# Patient Record
Sex: MALE | Race: WHITE | Employment: FULL TIME | URBAN - METROPOLITAN AREA
[De-identification: names, ages, dates, MRNs, and addresses within clinical notes are randomized per-mention and may not be internally consistent; named-entity substitution may affect disease eponyms.]

---

## 2017-02-09 PROCEDURE — 36415 COLL VENOUS BLD VENIPUNCTURE: CPT | Performed by: INTERNAL MEDICINE

## 2017-02-09 PROCEDURE — 84450 TRANSFERASE (AST) (SGOT): CPT | Performed by: INTERNAL MEDICINE

## 2017-02-09 PROCEDURE — 84460 ALANINE AMINO (ALT) (SGPT): CPT | Performed by: INTERNAL MEDICINE

## 2017-07-13 PROBLEM — R73.09 ELEVATED GLUCOSE: Status: ACTIVE | Noted: 2017-07-13

## 2018-07-17 PROBLEM — G47.33 OSA ON CPAP: Status: ACTIVE | Noted: 2018-07-17

## 2018-07-17 PROBLEM — Z99.89 OSA ON CPAP: Status: ACTIVE | Noted: 2018-07-17

## 2019-03-26 PROBLEM — E66.9 OBESITY (BMI 35.0-39.9 WITHOUT COMORBIDITY): Status: ACTIVE | Noted: 2019-03-26

## 2020-02-23 ENCOUNTER — HOSPITAL ENCOUNTER (EMERGENCY)
Age: 60
Discharge: HOME | End: 2020-02-23
Attending: EMERGENCY MEDICINE
Payer: COMMERCIAL

## 2020-02-23 VITALS
DIASTOLIC BLOOD PRESSURE: 75 MMHG | RESPIRATION RATE: 20 BRPM | HEIGHT: 76 IN | SYSTOLIC BLOOD PRESSURE: 132 MMHG | OXYGEN SATURATION: 92 % | WEIGHT: 270 LBS | TEMPERATURE: 99 F | BODY MASS INDEX: 32.88 KG/M2 | HEART RATE: 87 BPM

## 2020-02-23 DIAGNOSIS — J11.1 INFLUENZA: Primary | ICD-10-CM

## 2020-02-23 LAB
POCT INFLUENZA A: POSITIVE
POCT INFLUENZA B: NEGATIVE

## 2020-02-23 PROCEDURE — 99282 EMERGENCY DEPT VISIT SF MDM: CPT

## 2020-02-23 PROCEDURE — 87502 INFLUENZA DNA AMP PROBE: CPT | Performed by: EMERGENCY MEDICINE

## 2020-02-23 RX ORDER — ACETAMINOPHEN 500 MG
1000 TABLET ORAL ONCE
Status: COMPLETED | OUTPATIENT
Start: 2020-02-23 | End: 2020-02-23

## 2020-02-24 NOTE — ED PROVIDER NOTES
Patient Seen in: [facility] In [place]      History   Patient presents with:  Cough/URI    Stated Complaint: fever, cough, chills, body aches x 2 days     HPI    64-year-old male comes to the hospital chief complaint having difficulty kg   SpO2 92%   BMI 32.87 kg/m²         Physical Exam    HEENT : NCAT, EOMI, PEERL, throat clear, neck supple, no JVD, trachea midline, No LAD  Heart: S1S2 normal. No murmurs, regular rate and rhythm  Lungs: Clear to auscultation bilaterally  Abdomen: Soft

## 2020-07-17 ENCOUNTER — HOSPITAL ENCOUNTER (OUTPATIENT)
Dept: CT IMAGING | Age: 60
Discharge: HOME | End: 2020-07-17
Attending: INTERNAL MEDICINE

## 2020-07-17 DIAGNOSIS — I10 ESSENTIAL HYPERTENSION: ICD-10-CM

## 2020-07-21 ENCOUNTER — ORDER TRANSCRIPTION (OUTPATIENT)
Dept: ADMINISTRATIVE | Facility: HOSPITAL | Age: 60
End: 2020-07-21

## 2020-07-21 DIAGNOSIS — Z13.9 ENCOUNTER FOR SCREENING: Primary | ICD-10-CM

## 2020-08-20 ENCOUNTER — HOSPITAL ENCOUNTER (OUTPATIENT)
Dept: CARDIOLOGY CLINIC | Facility: HOSPITAL | Age: 60
Discharge: HOME | End: 2020-08-20
Attending: INTERNAL MEDICINE

## 2020-08-20 DIAGNOSIS — Z13.9 ENCOUNTER FOR SCREENING: ICD-10-CM

## 2020-09-04 ENCOUNTER — ORDER TRANSCRIPTION (OUTPATIENT)
Dept: ADMINISTRATIVE | Facility: HOSPITAL | Age: 60
End: 2020-09-04

## 2020-09-04 DIAGNOSIS — Z01.818 OTHER SPECIFIED PRE-OPERATIVE EXAMINATION: Primary | ICD-10-CM

## 2020-09-04 DIAGNOSIS — Z11.59 ENCOUNTER FOR SCREENING FOR OTHER VIRAL DISEASES: ICD-10-CM

## 2020-09-04 PROBLEM — E04.1 THYROID NODULE: Status: ACTIVE | Noted: 2020-09-04

## 2020-09-18 RX ORDER — SODIUM CHLORIDE, SODIUM LACTATE, POTASSIUM CHLORIDE, CALCIUM CHLORIDE 600; 310; 30; 20 MG/100ML; MG/100ML; MG/100ML; MG/100ML
INJECTION, SOLUTION INTRAVENOUS CONTINUOUS
Status: CANCELLED | OUTPATIENT
Start: 2020-09-18

## 2020-09-23 ENCOUNTER — LAB ENCOUNTER (OUTPATIENT)
Dept: LAB | Facility: HOSPITAL | Age: 60
End: 2020-09-23
Attending: OTOLARYNGOLOGY
Payer: COMMERCIAL

## 2020-09-23 DIAGNOSIS — E04.1 THYROID NODULE: ICD-10-CM

## 2020-09-23 DIAGNOSIS — Z11.59 ENCOUNTER FOR SCREENING FOR OTHER VIRAL DISEASES: ICD-10-CM

## 2020-09-23 DIAGNOSIS — Z01.818 OTHER SPECIFIED PRE-OPERATIVE EXAMINATION: ICD-10-CM

## 2020-09-23 LAB
ANION GAP SERPL CALC-SCNC: 2 MMOL/L (ref 0–18)
BUN BLD-MCNC: 27 MG/DL (ref 7–18)
BUN/CREAT SERPL: 21.6 (ref 10–20)
CALCIUM BLD-MCNC: 9.5 MG/DL (ref 8.5–10.1)
CHLORIDE SERPL-SCNC: 108 MMOL/L (ref 98–112)
CO2 SERPL-SCNC: 31 MMOL/L (ref 21–32)
CREAT BLD-MCNC: 1.25 MG/DL
GLUCOSE BLD-MCNC: 95 MG/DL (ref 70–99)
OSMOLALITY SERPL CALC.SUM OF ELEC: 297 MOSM/KG (ref 275–295)
PATIENT FASTING Y/N/NP: NO
POTASSIUM SERPL-SCNC: 4 MMOL/L (ref 3.5–5.1)
SODIUM SERPL-SCNC: 141 MMOL/L (ref 136–145)

## 2020-09-23 PROCEDURE — 93005 ELECTROCARDIOGRAM TRACING: CPT

## 2020-09-23 PROCEDURE — 36415 COLL VENOUS BLD VENIPUNCTURE: CPT

## 2020-09-23 PROCEDURE — 80048 BASIC METABOLIC PNL TOTAL CA: CPT

## 2020-09-23 PROCEDURE — 93010 ELECTROCARDIOGRAM REPORT: CPT | Performed by: INTERNAL MEDICINE

## 2020-09-24 LAB
ATRIAL RATE: 82 BPM
P AXIS: 40 DEGREES
P-R INTERVAL: 174 MS
Q-T INTERVAL: 386 MS
QRS DURATION: 116 MS
QTC CALCULATION (BEZET): 450 MS
R AXIS: -23 DEGREES
T AXIS: 15 DEGREES
VENTRICULAR RATE: 82 BPM

## 2020-09-25 ENCOUNTER — ANESTHESIA EVENT (OUTPATIENT)
Dept: ULTRASOUND IMAGING | Facility: HOSPITAL | Age: 60
End: 2020-09-25
Payer: COMMERCIAL

## 2020-09-25 ENCOUNTER — ANESTHESIA (OUTPATIENT)
Dept: ULTRASOUND IMAGING | Facility: HOSPITAL | Age: 60
End: 2020-09-25
Payer: COMMERCIAL

## 2020-09-25 ENCOUNTER — HOSPITAL ENCOUNTER (OUTPATIENT)
Dept: ULTRASOUND IMAGING | Facility: HOSPITAL | Age: 60
Discharge: HOME | End: 2020-09-25
Attending: OTOLARYNGOLOGY
Payer: COMMERCIAL

## 2020-09-25 VITALS
HEART RATE: 71 BPM | HEIGHT: 75.5 IN | BODY MASS INDEX: 33.22 KG/M2 | SYSTOLIC BLOOD PRESSURE: 122 MMHG | WEIGHT: 270 LBS | DIASTOLIC BLOOD PRESSURE: 84 MMHG | RESPIRATION RATE: 18 BRPM | TEMPERATURE: 98 F | OXYGEN SATURATION: 97 %

## 2020-09-25 DIAGNOSIS — E04.1 THYROID NODULE: Primary | ICD-10-CM

## 2020-09-25 LAB — SARS-COV-2 RNA RESP QL NAA+PROBE: NOT DETECTED

## 2020-09-25 PROCEDURE — 10005 FNA BX W/US GDN 1ST LES: CPT | Performed by: OTOLARYNGOLOGY

## 2020-09-25 PROCEDURE — 88173 CYTOPATH EVAL FNA REPORT: CPT | Performed by: OTOLARYNGOLOGY

## 2020-09-25 RX ORDER — MIDAZOLAM HYDROCHLORIDE 1 MG/ML
INJECTION INTRAMUSCULAR; INTRAVENOUS AS NEEDED
Status: DISCONTINUED | OUTPATIENT
Start: 2020-09-25 | End: 2020-09-25 | Stop reason: SURG

## 2020-09-25 RX ORDER — SODIUM CHLORIDE, SODIUM LACTATE, POTASSIUM CHLORIDE, CALCIUM CHLORIDE 600; 310; 30; 20 MG/100ML; MG/100ML; MG/100ML; MG/100ML
INJECTION, SOLUTION INTRAVENOUS CONTINUOUS
Status: DISCONTINUED | OUTPATIENT
Start: 2020-09-25 | End: 2020-09-27

## 2020-09-25 RX ORDER — NALOXONE HYDROCHLORIDE 0.4 MG/ML
80 INJECTION, SOLUTION INTRAMUSCULAR; INTRAVENOUS; SUBCUTANEOUS AS NEEDED
Status: ACTIVE | OUTPATIENT
Start: 2020-09-25 | End: 2020-09-25

## 2020-09-25 RX ORDER — HYDROMORPHONE HYDROCHLORIDE 1 MG/ML
0.4 INJECTION, SOLUTION INTRAMUSCULAR; INTRAVENOUS; SUBCUTANEOUS EVERY 5 MIN PRN
Status: ACTIVE | OUTPATIENT
Start: 2020-09-25 | End: 2020-09-25

## 2020-09-25 RX ORDER — LIDOCAINE HYDROCHLORIDE 10 MG/ML
INJECTION, SOLUTION EPIDURAL; INFILTRATION; INTRACAUDAL; PERINEURAL AS NEEDED
Status: DISCONTINUED | OUTPATIENT
Start: 2020-09-25 | End: 2020-09-25 | Stop reason: SURG

## 2020-09-25 RX ORDER — SODIUM CHLORIDE 9 MG/ML
INJECTION, SOLUTION INTRAVENOUS CONTINUOUS PRN
Status: DISCONTINUED | OUTPATIENT
Start: 2020-09-25 | End: 2020-09-25 | Stop reason: SURG

## 2020-09-25 RX ADMIN — SODIUM CHLORIDE: 9 INJECTION, SOLUTION INTRAVENOUS at 08:55:00

## 2020-09-25 RX ADMIN — LIDOCAINE HYDROCHLORIDE 50 MG: 10 INJECTION, SOLUTION EPIDURAL; INFILTRATION; INTRACAUDAL; PERINEURAL at 09:00:00

## 2020-09-25 RX ADMIN — MIDAZOLAM HYDROCHLORIDE 2 MG: 1 INJECTION INTRAMUSCULAR; INTRAVENOUS at 09:00:00

## 2020-09-25 NOTE — OR NURSING
Discharge instructions discussed with patient and wife, verbalized understanding. Patient tolerating PO without difficulty, anxious to go home.

## 2020-09-25 NOTE — PROCEDURES
PROCEDURE: US FNA THYROID, GUIDED INCLD, FIRST LESION (CPT=10005)    COMPARISON: None.     INDICATIONS: E04.1 Nontoxic single thyroid nodule    DESCRIPTION: The risks, benefits, and alternatives of the procedure were explained to the patient and witnessed i

## 2020-09-25 NOTE — ANESTHESIA POSTPROCEDURE EVALUATION
[address] Patient Status:  Outpatient   Age/Gender 62year old male MRN [id number]   Location [address] [place] [address] Attending Saurabh Pitt MD   Hosp Day # 0 PCP Dahlia Dykes MD       Anesthesia Post-op Note    * No p

## 2020-09-25 NOTE — ANESTHESIA PREPROCEDURE EVALUATION
PRE-OP EVALUATION    Patient Name: Quentin Elizabeth    Pre-op Diagnosis: thyroid nodule    FNA US guided biopsy    Radiologist Dr. Dayanara Card reviewed.     /84 (BP Location: Right arm)   Pulse 71   Temp 98.2 °F (36.8 °C) (Temporal)   R (MULTI-VITAMIN) Oral Tab, Take 1 tablet by mouth daily. , Disp: , Rfl:     •  B Complex Vitamins (VITAMIN B COMPLEX) Oral Tab, Take by mouth daily.   , Disp: , Rfl:     •  Cholecalciferol (VITAMIN D3) 5000 UNITS Oral Cap, Take by mouth., Disp: , Rfl:     • and patient meets guidelines. Post-procedure pain management plan discussed with surgeon and patient.     Comment: MAC with GA  Risks and benefits of GA including sorethroat,allergy,nausea, vomiting,dental trauma,pain management modalities,transfusion if

## 2020-09-30 NOTE — PROGRESS NOTES
Left detailed message per phone protocol with results and recommendations per RB. Reminder sent. Requested callback if any questions.

## 2024-08-30 RX ORDER — SCOLOPAMINE TRANSDERMAL SYSTEM 1 MG/1
1 PATCH, EXTENDED RELEASE TRANSDERMAL ONCE
Status: CANCELLED | OUTPATIENT
Start: 2024-08-30 | End: 2024-08-30

## 2024-09-12 ENCOUNTER — ANESTHESIA EVENT (OUTPATIENT)
Dept: SURGERY | Facility: HOSPITAL | Age: 64
End: 2024-09-12
Payer: COMMERCIAL

## 2024-09-12 NOTE — ANESTHESIA PREPROCEDURE EVALUATION
PRE-OP EVALUATION    Patient Name: Jose Mcclelland    Admit Diagnosis: BENIGN PROSTATIC HYPERPLASIA WITH LOWER URINARY TRACT SYMPTOMS    Pre-op Diagnosis: BENIGN PROSTATIC HYPERPLASIA WITH LOWER URINARY TRACT SYMPTOMS    CYSTOSCOPY TRANSURETHRAL RESECTION OF PROSTATE, LOOPOSCOPY    Anesthesia Procedure: CYSTOSCOPY TRANSURETHRAL RESECTION OF PROSTATE, LOOPOSCOPY (Bladder)    Surgeons and Role:     * Mercy Madden MD - Primary    Pre-op vitals reviewed.  Temp: 98 °F (36.7 °C)  Pulse: 68  Resp: 16  BP: 164/87  SpO2: 96 %  Body mass index is 33.96 kg/m².    Current medications reviewed.  Hospital Medications:   [Transfer Hold] acetaminophen (Tylenol Extra Strength) tab 1,000 mg  1,000 mg Oral Once    lactated ringers infusion   Intravenous Continuous    ceFAZolin (Ancef) 3 g in sodium chloride 0.9% 100mL IVPB premix  3 g Intravenous Once       Outpatient Medications:     Medications Prior to Admission   Medication Sig Dispense Refill Last Dose    NIACIN OR daily.   9/11/2024    METFORMIN HCL OR Take by mouth daily.   9/10/2024    simvastatin 40 MG Oral Tab Take 1 tablet (40 mg total) by mouth daily. (Patient taking differently: Take 1 tablet (40 mg total) by mouth nightly.) 90 tablet 3 9/11/2024    tamsulosin (FLOMAX) cap Take 1 capsule (0.4 mg total) by mouth daily. (Patient taking differently: Take 1 capsule (0.4 mg total) by mouth every evening.) 90 capsule 3 9/11/2024    Fenofibrate 160 MG Oral Tab Take 1 tablet (160 mg total) by mouth daily with food. 90 tablet 3 9/11/2024    Triamterene-HCTZ 37.5-25 MG Oral Tab Take 1 tablet by mouth daily. 90 tablet 3 9/11/2024    tolterodine tartrate 4 MG Oral Capsule SR 24 Hr Take 1 capsule (4 mg total) by mouth daily. 90 capsule 3 9/11/2024    Lisinopril-hydroCHLOROthiazide 20-25 MG Oral Tab Take 2 tablets by mouth daily. (Patient taking differently: Take 2 tablets by mouth every evening.) 180 tablet 3 9/11/2024    dilTIAZem 240 MG Oral Capsule SR 24 Hr Take 2  capsules (480 mg total) by mouth daily. 180 capsule 3 9/11/2024    Probiotic Product (PROBIOTIC OR) Take by mouth daily.   9/10/2024    Magnesium 400 MG Oral Cap Take by mouth.   9/11/2024    ASPIRIN 81 MG OR TABS 1 TABLET DAILY   9/11/2024    Phentermine HCl 30 MG Oral Cap Take 1 capsule (30 mg total) by mouth every morning. 30 capsule 2 9/11/2024    Phentermine HCl 30 MG Oral Cap Take 1 capsule (30 mg total) by mouth every morning. 30 capsule 2 9/11/2024       Allergies: Patient has no known allergies.      Anesthesia Evaluation    Patient summary reviewed.    Anesthetic Complications  (-) history of anesthetic complications         GI/Hepatic/Renal  Comment: BPH with LUTS for cystoscopy and TURP                               Cardiovascular      ECG reviewed.          (+) obesity  (+) hypertension   (+) hyperlipidemia                                  Endo/Other      (+) diabetes                            Pulmonary                    (+) sleep apnea and CPAP      Neuro/Psych    Negative neuro/psych ROS.                                  Past Surgical History:   Procedure Laterality Date    Biopsy of thyroid,percut      w sedation per pt    Colonoscopy,diagnostic  10/06/2015    diverticulosis    Colonoscopy,diagnostic N/A 10/06/2015    Procedure: COLONOSCOPY, POSSIBLE BIOPSY, POSSIBLE POLYPECTOMY 77352;  Surgeon: Jas Lindquist MD;  Location: [facility] [place], [facility]    Other surgical history      RT KNEE ARTHROSCOPIC SX    Tonsillectomy       Social History     Socioeconomic History    Marital status:    Tobacco Use    Smoking status: Passive Smoke Exposure - Never Smoker    Smokeless tobacco: Never   Vaping Use    Vaping status: Never Used   Substance and Sexual Activity    Alcohol use: No    Drug use: No     History   Drug Use No     Available pre-op labs reviewed.               Airway      Mallampati: III  Mouth opening: >3 FB  TM distance: > 6 cm  Neck ROM: full Cardiovascular    Cardiovascular exam  normal.  Rhythm: regular  Rate: normal  (-) murmur   Dental    Dentition appears grossly intact         Pulmonary    Pulmonary exam normal.  Breath sounds clear to auscultation bilaterally.               Other findings              ASA: 2   Plan: general  NPO status verified and patient meets guidelines.        Comment: GA with OETT/LMA explained to patient. Risks/benefits explained including but not limited to sore throat, hoarse voice, nausea, dental trauma, eye injury,pulmonary complication and other complications as discussed in anesthesia consent form. Patient agrees to proceed. Anesthesia consent signed.     Plan/risks discussed with: patient and spouse                Present on Admission:  **None**

## 2024-09-13 ENCOUNTER — HOSPITAL ENCOUNTER (OUTPATIENT)
Facility: HOSPITAL | Age: 64
Discharge: HOME | End: 2024-09-15
Attending: UROLOGY | Admitting: UROLOGY
Payer: COMMERCIAL

## 2024-09-13 ENCOUNTER — ANESTHESIA (OUTPATIENT)
Dept: SURGERY | Facility: HOSPITAL | Age: 64
End: 2024-09-13
Payer: COMMERCIAL

## 2024-09-13 DIAGNOSIS — I10 HTN (HYPERTENSION): ICD-10-CM

## 2024-09-13 DIAGNOSIS — E78.2 HYPERLIPEMIA, MIXED: ICD-10-CM

## 2024-09-13 DIAGNOSIS — I10 PRIMARY HYPERTENSION: ICD-10-CM

## 2024-09-13 DIAGNOSIS — I10 ESSENTIAL HYPERTENSION: ICD-10-CM

## 2024-09-13 DIAGNOSIS — R73.09 ELEVATED GLUCOSE: ICD-10-CM

## 2024-09-13 PROBLEM — N40.1 BPH LOC W URIN OBS/LUTS: Status: ACTIVE | Noted: 2024-09-13

## 2024-09-13 LAB — GLUCOSE BLD-MCNC: 157 MG/DL (ref 70–99)

## 2024-09-13 PROCEDURE — 88305 TISSUE EXAM BY PATHOLOGIST: CPT | Performed by: UROLOGY

## 2024-09-13 PROCEDURE — 0VT08ZZ RESECTION OF PROSTATE, VIA NATURAL OR ARTIFICIAL OPENING ENDOSCOPIC: ICD-10-PCS | Performed by: UROLOGY

## 2024-09-13 PROCEDURE — 94799 UNLISTED PULMONARY SVC/PX: CPT

## 2024-09-13 PROCEDURE — 82962 GLUCOSE BLOOD TEST: CPT

## 2024-09-13 PROCEDURE — 94660 CPAP INITIATION&MGMT: CPT

## 2024-09-13 RX ORDER — NALOXONE HYDROCHLORIDE 0.4 MG/ML
0.08 INJECTION, SOLUTION INTRAMUSCULAR; INTRAVENOUS; SUBCUTANEOUS AS NEEDED
Status: DISCONTINUED | OUTPATIENT
Start: 2024-09-13 | End: 2024-09-13 | Stop reason: HOSPADM

## 2024-09-13 RX ORDER — SODIUM CHLORIDE 9 MG/ML
INJECTION, SOLUTION INTRAVENOUS CONTINUOUS
Status: DISCONTINUED | OUTPATIENT
Start: 2024-09-13 | End: 2024-09-14

## 2024-09-13 RX ORDER — ONDANSETRON 2 MG/ML
INJECTION INTRAMUSCULAR; INTRAVENOUS AS NEEDED
Status: DISCONTINUED | OUTPATIENT
Start: 2024-09-13 | End: 2024-09-13 | Stop reason: SURG

## 2024-09-13 RX ORDER — MAGNESIUM HYDROXIDE 1200 MG/15ML
3000 LIQUID ORAL CONTINUOUS
Status: DISCONTINUED | OUTPATIENT
Start: 2024-09-13 | End: 2024-09-15

## 2024-09-13 RX ORDER — HYDROMORPHONE HYDROCHLORIDE 1 MG/ML
0.4 INJECTION, SOLUTION INTRAMUSCULAR; INTRAVENOUS; SUBCUTANEOUS EVERY 5 MIN PRN
Status: DISCONTINUED | OUTPATIENT
Start: 2024-09-13 | End: 2024-09-13 | Stop reason: HOSPADM

## 2024-09-13 RX ORDER — ATORVASTATIN CALCIUM 40 MG/1
40 TABLET, FILM COATED ORAL NIGHTLY
Status: DISCONTINUED | OUTPATIENT
Start: 2024-09-13 | End: 2024-09-15

## 2024-09-13 RX ORDER — PHENAZOPYRIDINE HYDROCHLORIDE 100 MG/1
200 TABLET, FILM COATED ORAL 3 TIMES DAILY PRN
Status: DISCONTINUED | OUTPATIENT
Start: 2024-09-13 | End: 2024-09-15

## 2024-09-13 RX ORDER — ACETAMINOPHEN 325 MG/1
650 TABLET ORAL
Status: DISCONTINUED | OUTPATIENT
Start: 2024-09-13 | End: 2024-09-15

## 2024-09-13 RX ORDER — DEXAMETHASONE SODIUM PHOSPHATE 4 MG/ML
VIAL (ML) INJECTION AS NEEDED
Status: DISCONTINUED | OUTPATIENT
Start: 2024-09-13 | End: 2024-09-13 | Stop reason: SURG

## 2024-09-13 RX ORDER — CEFAZOLIN SODIUM IN 0.9 % NACL 3 G/100 ML
3 INTRAVENOUS SOLUTION, PIGGYBACK (ML) INTRAVENOUS ONCE
Status: COMPLETED | OUTPATIENT
Start: 2024-09-13 | End: 2024-09-13

## 2024-09-13 RX ORDER — PROCHLORPERAZINE EDISYLATE 5 MG/ML
5 INJECTION INTRAMUSCULAR; INTRAVENOUS EVERY 8 HOURS PRN
Status: DISCONTINUED | OUTPATIENT
Start: 2024-09-13 | End: 2024-09-13 | Stop reason: HOSPADM

## 2024-09-13 RX ORDER — DEXTROSE MONOHYDRATE 25 G/50ML
50 INJECTION, SOLUTION INTRAVENOUS
Status: DISCONTINUED | OUTPATIENT
Start: 2024-09-13 | End: 2024-09-15

## 2024-09-13 RX ORDER — OXYCODONE HYDROCHLORIDE 10 MG/1
10 TABLET ORAL EVERY 4 HOURS PRN
Status: DISCONTINUED | OUTPATIENT
Start: 2024-09-13 | End: 2024-09-15

## 2024-09-13 RX ORDER — HYDROMORPHONE HYDROCHLORIDE 1 MG/ML
0.2 INJECTION, SOLUTION INTRAMUSCULAR; INTRAVENOUS; SUBCUTANEOUS EVERY 5 MIN PRN
Status: DISCONTINUED | OUTPATIENT
Start: 2024-09-13 | End: 2024-09-13 | Stop reason: HOSPADM

## 2024-09-13 RX ORDER — HYDROMORPHONE HYDROCHLORIDE 1 MG/ML
0.6 INJECTION, SOLUTION INTRAMUSCULAR; INTRAVENOUS; SUBCUTANEOUS EVERY 5 MIN PRN
Status: DISCONTINUED | OUTPATIENT
Start: 2024-09-13 | End: 2024-09-13 | Stop reason: HOSPADM

## 2024-09-13 RX ORDER — PHENTERMINE HYDROCHLORIDE 30 MG/1
30 CAPSULE ORAL EVERY MORNING
Status: DISCONTINUED | OUTPATIENT
Start: 2024-09-13 | End: 2024-09-13

## 2024-09-13 RX ORDER — SODIUM CHLORIDE, SODIUM LACTATE, POTASSIUM CHLORIDE, CALCIUM CHLORIDE 600; 310; 30; 20 MG/100ML; MG/100ML; MG/100ML; MG/100ML
INJECTION, SOLUTION INTRAVENOUS CONTINUOUS
Status: DISCONTINUED | OUTPATIENT
Start: 2024-09-13 | End: 2024-09-13 | Stop reason: HOSPADM

## 2024-09-13 RX ORDER — OXYBUTYNIN CHLORIDE 5 MG/1
10 TABLET, EXTENDED RELEASE ORAL DAILY
Status: DISCONTINUED | OUTPATIENT
Start: 2024-09-14 | End: 2024-09-15

## 2024-09-13 RX ORDER — LABETALOL HYDROCHLORIDE 5 MG/ML
5 INJECTION, SOLUTION INTRAVENOUS EVERY 10 MIN PRN
Status: DISCONTINUED | OUTPATIENT
Start: 2024-09-13 | End: 2024-09-13 | Stop reason: HOSPADM

## 2024-09-13 RX ORDER — LISINOPRIL AND HYDROCHLOROTHIAZIDE 20; 25 MG/1; MG/1
2 TABLET ORAL DAILY
Status: DISCONTINUED | OUTPATIENT
Start: 2024-09-13 | End: 2024-09-13

## 2024-09-13 RX ORDER — TAMSULOSIN HYDROCHLORIDE 0.4 MG/1
0.4 CAPSULE ORAL DAILY
Status: DISCONTINUED | OUTPATIENT
Start: 2024-09-13 | End: 2024-09-13

## 2024-09-13 RX ORDER — OXYCODONE HYDROCHLORIDE 5 MG/1
5 TABLET ORAL EVERY 4 HOURS PRN
Status: DISCONTINUED | OUTPATIENT
Start: 2024-09-13 | End: 2024-09-15

## 2024-09-13 RX ORDER — NICOTINE POLACRILEX 4 MG
30 LOZENGE BUCCAL
Status: DISCONTINUED | OUTPATIENT
Start: 2024-09-13 | End: 2024-09-15

## 2024-09-13 RX ORDER — TAMSULOSIN HYDROCHLORIDE 0.4 MG/1
0.4 CAPSULE ORAL NIGHTLY
Status: DISCONTINUED | OUTPATIENT
Start: 2024-09-13 | End: 2024-09-15

## 2024-09-13 RX ORDER — ROCURONIUM BROMIDE 10 MG/ML
INJECTION, SOLUTION INTRAVENOUS AS NEEDED
Status: DISCONTINUED | OUTPATIENT
Start: 2024-09-13 | End: 2024-09-13 | Stop reason: SURG

## 2024-09-13 RX ORDER — SODIUM CHLORIDE, SODIUM LACTATE, POTASSIUM CHLORIDE, CALCIUM CHLORIDE 600; 310; 30; 20 MG/100ML; MG/100ML; MG/100ML; MG/100ML
INJECTION, SOLUTION INTRAVENOUS CONTINUOUS
Status: DISCONTINUED | OUTPATIENT
Start: 2024-09-13 | End: 2024-09-14

## 2024-09-13 RX ORDER — HYDROMORPHONE HYDROCHLORIDE 1 MG/ML
0.8 INJECTION, SOLUTION INTRAMUSCULAR; INTRAVENOUS; SUBCUTANEOUS EVERY 2 HOUR PRN
Status: DISCONTINUED | OUTPATIENT
Start: 2024-09-13 | End: 2024-09-15

## 2024-09-13 RX ORDER — TRIAMTERENE AND HYDROCHLOROTHIAZIDE 75; 50 MG/1; MG/1
0.5 TABLET ORAL DAILY
Status: DISCONTINUED | OUTPATIENT
Start: 2024-09-13 | End: 2024-09-13

## 2024-09-13 RX ORDER — HYDROMORPHONE HYDROCHLORIDE 1 MG/ML
0.4 INJECTION, SOLUTION INTRAMUSCULAR; INTRAVENOUS; SUBCUTANEOUS EVERY 2 HOUR PRN
Status: DISCONTINUED | OUTPATIENT
Start: 2024-09-13 | End: 2024-09-15

## 2024-09-13 RX ORDER — ONDANSETRON 2 MG/ML
4 INJECTION INTRAMUSCULAR; INTRAVENOUS EVERY 6 HOURS PRN
Status: DISCONTINUED | OUTPATIENT
Start: 2024-09-13 | End: 2024-09-15

## 2024-09-13 RX ORDER — ONDANSETRON 2 MG/ML
4 INJECTION INTRAMUSCULAR; INTRAVENOUS EVERY 6 HOURS PRN
Status: DISCONTINUED | OUTPATIENT
Start: 2024-09-13 | End: 2024-09-13 | Stop reason: HOSPADM

## 2024-09-13 RX ORDER — ACETAMINOPHEN 500 MG
1000 TABLET ORAL ONCE
Status: DISCONTINUED | OUTPATIENT
Start: 2024-09-13 | End: 2024-09-13 | Stop reason: HOSPADM

## 2024-09-13 RX ORDER — FENOFIBRATE 67 MG/1
67 CAPSULE ORAL
Status: DISCONTINUED | OUTPATIENT
Start: 2024-09-14 | End: 2024-09-15

## 2024-09-13 RX ORDER — SENNOSIDES 8.6 MG
17.2 TABLET ORAL NIGHTLY PRN
Status: DISCONTINUED | OUTPATIENT
Start: 2024-09-13 | End: 2024-09-15

## 2024-09-13 RX ORDER — MIDAZOLAM HYDROCHLORIDE 1 MG/ML
INJECTION INTRAMUSCULAR; INTRAVENOUS AS NEEDED
Status: DISCONTINUED | OUTPATIENT
Start: 2024-09-13 | End: 2024-09-13 | Stop reason: SURG

## 2024-09-13 RX ORDER — CEFAZOLIN SODIUM IN 0.9 % NACL 3 G/100 ML
3 INTRAVENOUS SOLUTION, PIGGYBACK (ML) INTRAVENOUS EVERY 8 HOURS
Status: COMPLETED | OUTPATIENT
Start: 2024-09-13 | End: 2024-09-14

## 2024-09-13 RX ORDER — LABETALOL HYDROCHLORIDE 5 MG/ML
INJECTION, SOLUTION INTRAVENOUS AS NEEDED
Status: DISCONTINUED | OUTPATIENT
Start: 2024-09-13 | End: 2024-09-13 | Stop reason: SURG

## 2024-09-13 RX ORDER — ONDANSETRON 4 MG/1
4 TABLET, ORALLY DISINTEGRATING ORAL EVERY 6 HOURS PRN
Status: DISCONTINUED | OUTPATIENT
Start: 2024-09-13 | End: 2024-09-15

## 2024-09-13 RX ORDER — LIDOCAINE HYDROCHLORIDE 10 MG/ML
INJECTION, SOLUTION EPIDURAL; INFILTRATION; INTRACAUDAL; PERINEURAL AS NEEDED
Status: DISCONTINUED | OUTPATIENT
Start: 2024-09-13 | End: 2024-09-13 | Stop reason: SURG

## 2024-09-13 RX ORDER — NICOTINE POLACRILEX 4 MG
15 LOZENGE BUCCAL
Status: DISCONTINUED | OUTPATIENT
Start: 2024-09-13 | End: 2024-09-15

## 2024-09-13 RX ADMIN — DEXAMETHASONE SODIUM PHOSPHATE 4 MG: 4 MG/ML VIAL (ML) INJECTION at 10:50:00

## 2024-09-13 RX ADMIN — MIDAZOLAM HYDROCHLORIDE 2 MG: 1 INJECTION INTRAMUSCULAR; INTRAVENOUS at 10:44:00

## 2024-09-13 RX ADMIN — LABETALOL HYDROCHLORIDE 10 MG: 5 INJECTION, SOLUTION INTRAVENOUS at 11:06:00

## 2024-09-13 RX ADMIN — SODIUM CHLORIDE, SODIUM LACTATE, POTASSIUM CHLORIDE, CALCIUM CHLORIDE: 600; 310; 30; 20 INJECTION, SOLUTION INTRAVENOUS at 12:25:00

## 2024-09-13 RX ADMIN — CEFAZOLIN SODIUM IN 0.9 % NACL 3 G: 3 G/100 ML INTRAVENOUS SOLUTION, PIGGYBACK (ML) INTRAVENOUS at 10:49:00

## 2024-09-13 RX ADMIN — ROCURONIUM BROMIDE 50 MG: 10 INJECTION, SOLUTION INTRAVENOUS at 10:46:00

## 2024-09-13 RX ADMIN — ONDANSETRON 4 MG: 2 INJECTION INTRAMUSCULAR; INTRAVENOUS at 10:50:00

## 2024-09-13 RX ADMIN — LIDOCAINE HYDROCHLORIDE 50 MG: 10 INJECTION, SOLUTION EPIDURAL; INFILTRATION; INTRACAUDAL; PERINEURAL at 10:46:00

## 2024-09-13 RX ADMIN — ROCURONIUM BROMIDE 10 MG: 10 INJECTION, SOLUTION INTRAVENOUS at 11:28:00

## 2024-09-13 NOTE — ANESTHESIA PROCEDURE NOTES
Airway  Date/Time: 9/13/2024 10:48 AM  Urgency: elective    Airway not difficult    General Information and Staff    Patient location during procedure: OR  Anesthesiologist: El Santos MD  Performed: anesthesiologist   Performed by: El Santos MD  Authorized by: El Santos MD      Indications and Patient Condition  Indications for airway management: anesthesia  Sedation level: deep  Preoxygenated: yes  Patient position: sniffing  Mask difficulty assessment: 1 - vent by mask    Final Airway Details  Final airway type: endotracheal airway      Successful airway: ETT  Cuffed: yes   Successful intubation technique: Video laryngoscopy  Endotracheal tube insertion site: oral  Blade: GlideScope  Blade size: #4  ETT size (mm): 7.5    Cormack-Lehane Classification: grade I - full view of glottis  Placement verified by: capnometry   Measured from: lips  ETT to lips (cm): 22  Number of attempts at approach: 1

## 2024-09-13 NOTE — PROGRESS NOTES
NURSING ADMISSION NOTE      Patient admitted via bed  Oriented to room 312.  Safety precautions initiated.  Bed in low position.  Call light in reach.    Alert and oriented x4. VSS on 3L nasal cannula CBI running at slow/moderate rate. Wife at bedside and updated on POC. Questions and concerns addressed    2 person skin check performed with Dianca, PCT - no wounds noted.

## 2024-09-13 NOTE — CONSULTS
[facility] Hospital[facility] H&P/Consult note       CC  post op med management   Asked to see pt by Dr Balderrama       PCP: Reid Boyce MD    History of Present Illness: Patient is a 64 year old male with PMH sig for HTN, HLD, DM 2, obesity, meghan, here for scheduled turp  Pain is controlled currently  No nv  Deniedd any cardiopulm disease. No hx of vte   On cpap, compliant   States he doesn't take his bp meds regularly and bp is controlled    PMH  Past Medical History:    BPH (benign prostatic hyperplasia)    High blood pressure    High cholesterol    Hx of motion sickness    Hyperlipemia    MEGHAN (obstructive sleep apnea)    AHI 44 RDI 48 REM AHI 61 Supine AHI 75 non-supine AHI 30.6 Sao2 En 75% CPAP 12     Sleep apnea    Unspecified essential hypertension    Visual impairment        PSH  Past Surgical History:   Procedure Laterality Date    Biopsy of thyroid,percut      w sedation per pt    Colonoscopy,diagnostic  10/06/2015    diverticulosis    Colonoscopy,diagnostic N/A 10/06/2015    Procedure: COLONOSCOPY, POSSIBLE BIOPSY, POSSIBLE POLYPECTOMY 54596;  Surgeon: Jas Lindquist MD;  [place] [facility]    Other surgical history      RT KNEE ARTHROSCOPIC SX    Tonsillectomy          ALL:  No Known Allergies     Home Medications:  Outpatient Medications Marked as Taking for the 9/13/24 encounter (Hospital Encounter)   Medication Sig Dispense Refill    NIACIN OR daily.      METFORMIN HCL OR Take by mouth 2 (two) times a day.      simvastatin 40 MG Oral Tab Take 1 tablet (40 mg total) by mouth daily. (Patient taking differently: Take 1 tablet (40 mg total) by mouth nightly.) 90 tablet 3    tamsulosin (FLOMAX) cap Take 1 capsule (0.4 mg total) by mouth daily. (Patient taking differently: Take 1 capsule (0.4 mg total) by mouth every evening.) 90 capsule 3    Fenofibrate 160 MG Oral Tab Take 1 tablet (160 mg total) by mouth daily with food. 90 tablet 3    Triamterene-HCTZ 37.5-25 MG Oral Tab Take 1 tablet by mouth  daily. (Patient taking differently: Take 1 tablet by mouth daily. Takes at night) 90 tablet 3    tolterodine tartrate 4 MG Oral Capsule SR 24 Hr Take 1 capsule (4 mg total) by mouth daily. 90 capsule 3    Lisinopril-hydroCHLOROthiazide 20-25 MG Oral Tab Take 2 tablets by mouth daily. (Patient taking differently: Take 2 tablets by mouth every evening.) 180 tablet 3    dilTIAZem 240 MG Oral Capsule SR 24 Hr Take 2 capsules (480 mg total) by mouth daily. 180 capsule 3    Probiotic Product (PROBIOTIC OR) Take by mouth daily.      Magnesium 400 MG Oral Cap Take by mouth.      ASPIRIN 81 MG OR TABS 1 TABLET DAILY           Soc Hx  Social History     Tobacco Use    Smoking status: Passive Smoke Exposure - Never Smoker    Smokeless tobacco: Never   Substance Use Topics    Alcohol use: No        Fam Hx  Family History   Problem Relation Age of Onset    Obesity Father     Other (Other) Father     Obesity Mother     Other (Other) Mother         thyroid disease    Obesity Sister     Other (Other) Sister         MEGHAN    Obesity Brother     Other (Other) Brother         MEGHAN       Review of Systems  Comprehensive ROS reviewed and negative except for what's stated above.       OBJECTIVE:  /81 (BP Location: Right arm)   Pulse 66   Temp 98.3 °F (36.8 °C) (Oral)   Resp 14   Ht 6' 3\" (1.905 m)   Wt 271 lb 11.5 oz (123.2 kg)   SpO2 96%   BMI 33.96 kg/m²   General:  Alert, no distress, appears stated age.   Head:  Normocephalic, without obvious abnormality, atraumatic.   Eyes:  Sclera anicteric, No conjunctival pallor, EOMs intact.    Throat: dry   Neck: Supple,    Lungs:   Clear to auscultation bilaterally. Normal effort   Chest wall:  No tenderness or deformity.   Heart:  Regular rate and rhythm    Abdomen:   Soft, NT/ND    Extremities: Atraumatic,    Skin: No visible rashes or lesions.    Neurologic: Normal strength, no focal deficit appreciated     Diagnostic Data:    CBC/Chem  No results for input(s): \"WBC\", \"HGB\",  \"MCV\", \"PLT\", \"BAND\", \"INR\" in the last 168 hours.    Invalid input(s): \"LYM#\", \"MONO#\", \"BASOS#\", \"EOSIN#\"    No results for input(s): \"NA\", \"K\", \"CL\", \"CO2\", \"BUN\", \"CREATSERUM\", \"GLU\", \"CA\", \"CAION\", \"MG\", \"PHOS\" in the last 168 hours.    No results for input(s): \"ALT\", \"AST\", \"ALB\", \"AMYLASE\", \"LIPASE\", \"LDH\" in the last 168 hours.    Invalid input(s): \"ALPHOS\", \"TBIL\", \"DBIL\", \"TPROT\"    No results for input(s): \"TROP\" in the last 168 hours.         Radiology: No results found.     ASSESSMENT / PLAN:     Patient is a 64 year old male with PMH sig for HTN, HLD, DM 2, obesity, ministerio, here for scheduled turp    Impression    -sp TURP  -post op pain    -DM 2  -HLD  -HTN    -obesity bmi 33  -MINISTERIO on CPAP      Plan    *  Post op surgical management per surgery    *HLD  -statin    *HTN  -on multiple meds at home, resume stepwise  Bp controlled currnetly    *MINISTERIO  -cpap    *DM2  -he would like to avoid accuchecks but agreed to check 1 more time today after discussions  -iss prn    scds        Further recommendations pending patient's clinical course. [facility] hospitalist to continue to follow patient while in house    Patient and/or patient's family given opportunity to ask questions and note understanding and agreeing with therapeutic plan as outlined    Collin Turner Hospitalist  [phone]  Answering Service: [phone]

## 2024-09-13 NOTE — H&P
Pre-op Diagnosis: BENIGN PROSTATIC HYPERPLASIA WITH LOWER URINARY TRACT SYMPTOMS    The above referenced H&P by Dr Boyce from 9/5/2024 was reviewed by Mercy Madden MD on 9/13/2024, the patient was examined and no significant changes have occurred in the patient's condition since the H&P was performed.  I discussed with the patient and/or legal representative the potential benefits, risks and side effects of this procedure; the likelihood of the patient achieving goals; and potential problems that might occur during recuperation.  I discussed reasonable alternatives to the procedure, including risks, benefits and side effects related to the alternatives and risks related to not receiving this procedure.  We will proceed with procedure as planned.    PACHECO Madden MD  9/13/2024

## 2024-09-13 NOTE — ANESTHESIA POSTPROCEDURE EVALUATION
[facility]    Jose Mcclelland Patient Status:  Outpatient in a Bed   Age/Gender 64 year old male MRN [id number]   Location [facility] POST ANESTHESIA CARE UNIT Attending Mercy Madden MD   Hosp Day # 0 PCP Reid Boyce MD       Anesthesia Post-op Note    CYSTOSCOPY TRANSURETHRAL RESECTION OF PROSTATE    Procedure Summary       Date: 09/13/24 Room / Location:  MAIN OR 07 / [facility] MAIN OR    Anesthesia Start: 1038 Anesthesia Stop: 1225    Procedure: CYSTOSCOPY TRANSURETHRAL RESECTION OF PROSTATE (Bladder) Diagnosis: (BENIGN PROSTATIC HYPERPLASIA WITH LOWER URINARY TRACT SYMPTOMS)    Surgeons: Mercy Madden MD Anesthesiologist: El Santos MD    Anesthesia Type: general ASA Status: 2            Anesthesia Type: general    Vitals Value Taken Time   /96 09/13/24 1226   Temp 97.4 09/13/24 1226   Pulse 73 09/13/24 1226   Resp 22 09/13/24 1226   SpO2 94 09/13/24 1226       Patient Location: PACU    Anesthesia Type: general    Airway Patency: patent and extubated    Postop Pain Control: adequate    Mental Status: mildly sedated but able to meaningfully participate in the post-anesthesia evaluation    Nausea/Vomiting: none    Cardiopulmonary/Hydration status: stable euvolemic    Complications: no apparent anesthesia related complications    Postop vital signs: stable    Dental Exam: Unchanged from Preop    Patient to be discharged from PACU when criteria met.

## 2024-09-13 NOTE — OPERATIVE REPORT
[facility]   part of [facility]      Jose Mcclelland Patient Status:  Outpatient in a Bed    1960 MRN [id number]   Location [facility] POST ANESTHESIA CARE UNIT Attending Mercy Madden MD   Hosp Day # 0 PCP Reid Boyce MD     Date of Operation; 2024    Procedure: Cystoscopy, Transurethral Resection of Prostate    Pre-Op Diagnosis:  BPH with LUTS  Post-Op Diagnosis: BPH with LUTS    Surgeon: Mercy Madden M.D.    Anesthesia:  General   Specimens: Prostate tissue  Blood Loss: 50 ml  Complications:  None  Drains:  22 Fr 3-way Soliman, CBI    Findings: Anterior Urethra was normal.  The prostate was trilobar in appearance, large intravesical lobe. The ureteral orifices were in the normal position. The bladder showed moderate trabeculations, no diverticula. After resesction, the underlying tissue was sent to pathology and the prostatic fossa was wide open.    Indications:  The patient is a 64 year old male who has a know history of BPH with LUTS, previously failed medical treatment on alpha-1 inhibitors.  He wishes to proceed with surgical intervention as his symptoms significantly impair his quality of life.  All risks, benefits, and potential adverse events were reviewed, as well as the personal involved in the surgery, and a consent was signed.     Technique:  The patient was brought to the operating room and placed in a supine position. A general anesthesia was induced.  Preoperative antibiotics were administered.  The patient was prepped and draped in the dorsal lithotomy position.  Sequential compression devices were placed on the lower extremities.  A time-out was reviewed.     A cystoscope was passed through the urethra under direct vision and the urethra and bladder were examined, with the findings as described above.  A 26-Hungarian resectoscope sheath was passed into the urethra, and then a bipolar loop was used to resect the prostate. Prior to proceeding with resection,  both ureter orrifeces and the Veru montanum (external sphincter) were clearly identified and careful attention was paid not to injury these structure. The prostate was resected in a systematic manner, starting with the median lobe, followed by the left and right lateral lobes.  The prostate specimen was irrigated out of the bladder with an Elick syringe.The base of the resection was coagulated and hemostasis was obtained. The irrigation was returning with only the slightest pink tinge. A 3-way 22 Fr reveles catheter was placed in the bladder and CBI was started at low rate.  There were no complications and the patient tolerated the procedure well.    The patient was awakened from anesthesia and transferred to PACU in stable condition.  He will be admitted to regular floor over night.      Mercy Madden MD  [facility]  9/13/2024

## 2024-09-14 LAB
ANION GAP SERPL CALC-SCNC: 5 MMOL/L (ref 0–18)
BUN BLD-MCNC: 15 MG/DL (ref 9–23)
CALCIUM BLD-MCNC: 9.5 MG/DL (ref 8.7–10.4)
CHLORIDE SERPL-SCNC: 104 MMOL/L (ref 98–112)
CO2 SERPL-SCNC: 32 MMOL/L (ref 21–32)
CREAT BLD-MCNC: 0.94 MG/DL
EGFRCR SERPLBLD CKD-EPI 2021: 91 ML/MIN/1.73M2 (ref 60–?)
ERYTHROCYTE [DISTWIDTH] IN BLOOD BY AUTOMATED COUNT: 13.5 %
GLUCOSE BLD-MCNC: 150 MG/DL (ref 70–99)
HCT VFR BLD AUTO: 38 %
HGB BLD-MCNC: 13.1 G/DL
MCH RBC QN AUTO: 30 PG (ref 26–34)
MCHC RBC AUTO-ENTMCNC: 34.5 G/DL (ref 31–37)
MCV RBC AUTO: 87 FL
OSMOLALITY SERPL CALC.SUM OF ELEC: 296 MOSM/KG (ref 275–295)
PLATELET # BLD AUTO: 175 10(3)UL (ref 150–450)
POTASSIUM SERPL-SCNC: 4.7 MMOL/L (ref 3.5–5.1)
RBC # BLD AUTO: 4.37 X10(6)UL
SODIUM SERPL-SCNC: 141 MMOL/L (ref 136–145)
WBC # BLD AUTO: 7.8 X10(3) UL (ref 4–11)

## 2024-09-14 PROCEDURE — 85027 COMPLETE CBC AUTOMATED: CPT | Performed by: UROLOGY

## 2024-09-14 PROCEDURE — 94799 UNLISTED PULMONARY SVC/PX: CPT

## 2024-09-14 PROCEDURE — 80048 BASIC METABOLIC PNL TOTAL CA: CPT | Performed by: UROLOGY

## 2024-09-14 NOTE — PROGRESS NOTES
CC: follow-up hospital admission surgery    SUBJECTIVE:  Interval History:     No acute issues overnight  Hasn't been up  Lives 3 hrs away    OBJECTIVE:  Scheduled Meds:    dilTIAZem ER  480 mg Oral Daily    fenofibrate micronized  67 mg Oral Daily with breakfast    atorvastatin  40 mg Oral Nightly    oxybutynin ER  10 mg Oral Daily    acetaminophen  650 mg Oral Q4H While awake    insulin aspart  1-5 Units Subcutaneous TID AC and HS    tamsulosin  0.4 mg Oral Nightly     Continuous Infusions:    sodium chloride      sodium chloride       PRN Meds:   oxyCODONE **OR** oxyCODONE    HYDROmorphone **OR** HYDROmorphone    ondansetron **OR** ondansetron    sennosides    phenazopyridine    glucose **OR** glucose **OR** glucose-vitamin C **OR** dextrose **OR** glucose **OR** glucose **OR** glucose-vitamin C    PHYSICAL EXAM  Vital signs: Temp:  [97.6 °F (36.4 °C)-98.4 °F (36.9 °C)] 97.7 °F (36.5 °C)  Pulse:  [53-80] 62  Resp:  [8-22] 18  BP: (124-152)/(66-96) 148/79  SpO2:  [89 %-97 %] 93 %      GENERAL - NAD, AAO  EYES- sclera anicteric   HENT- normocephalic   NECK - no JVD  CV- RRR  RESP - CTAB, normal resp effort  ABDOMEN- soft, NT/ND   EXT- no LE edema        Data Review:   Labs:   Recent Labs   Lab 09/14/24  0658   WBC 7.8   HGB 13.1   MCV 87.0   .0       Recent Labs   Lab 09/14/24  0658      K 4.7      CO2 32.0   BUN 15   CREATSERUM 0.94   CA 9.5   *       No results for input(s): \"ALT\", \"AST\", \"ALB\", \"AMYLASE\", \"LIPASE\", \"LDH\" in the last 168 hours.    Invalid input(s): \"ALPHOS\", \"TBIL\", \"DBIL\", \"TPROT\"    Recent Labs   Lab 09/13/24  1555   PGLU 157*           ASSESSMENT/PLAN:    Patient is a 64 year old male with PMH sig for HTN, HLD, DM 2, obesity, ministerio, here for scheduled turp     Impression     -sp TURP  -post op pain     -DM 2  -HLD  -HTN     -obesity bmi 33  -MINISTERIO on CPAP        Plan     *  Post op surgical management per surgery     *HLD  -statin     *HTN  -on multiple meds at home,  resume stepwise  Bp ok today     *MEGHAN  -cpap    *DM2  -glucs overall controlled  Has refused accuchecks / iss     scds       Will continue to follow while hospitalized. Please page me or the on-call hospitalist with questions or concerns.    Collin Turner Hospitalist  [phone]  Answering Service: [phone]

## 2024-09-14 NOTE — PROGRESS NOTES
[facility] Progress Note    Jose Mcclelland Patient Status:  Outpatient in a Bed    1960 MRN [id number]   Location [facility] Attending Mercy Madden MD   Hosp Day # 0 PCP Reid Boyce MD     Subjective:  Jose Mcclelland is a(n) 64 year old male.    Hospital course to date: POD#1 s/p TURP    Current  complaints: He is feeling well. No pain. Hematuria increased since CBI clamped this am.    Objective:  Temp (24hrs), Av.2 °F (36.8 °C), Min:97.6 °F (36.4 °C), Max:98.6 °F (37 °C)    /78 (BP Location: Left arm)   Pulse 67   Temp 98.6 °F (37 °C) (Oral)   Resp 18   Ht 6' 3\" (1.905 m)   Wt 271 lb 11.5 oz (123.2 kg)   SpO2 92%   BMI 33.96 kg/m²     Intake/Output Summary (Last 24 hours) at 2024 1259  Last data filed at 2024 0811  Gross per 24 hour   Intake 514 ml   Output -2325 ml   Net 2839 ml     General: Calm, NAD  HEENT: NCAT, no scleral icterus  CV: RR  Pulmonary: breathing unlabored, symmetric bilaterally, no audible wheezes  Soliman draining dark pink with small clots    Laboratory Data:  Lab Results   Component Value Date    WBC 7.8 2024    HGB 13.1 2024    HCT 38.0 2024    .0 2024    CREATSERUM 0.94 2024    BUN 15 2024     2024    K 4.7 2024     2024    CO2 32.0 2024     2024    CA 9.5 2024    PGLU 157 2024         Hospital Problem List:  Patient Active Problem List   Diagnosis    HTN (hypertension)    Bronchitis    Hyperlipemia, mixed    Hemochromatosis    Elevated glucose    MEGHAN on CPAP    Obesity (BMI 35.0-39.9 without comorbidity)    Thyroid nodule    BPH loc w urin obs/LUTS       IMPRESSION    1. POD#1 s/p TURP  -Hematuria increased since CBI clamped this am    PLAN  1. Resumed CBI  2. Will try clamping again tomorrow if staying more clear today    The above impression and plan were discussed in detail with the  patient and his wife who verbalized understanding and all questions were answered.    Discussed with RN    Alonso Cardenas D.O.  [facility]      9/14/2024  12:59 PM

## 2024-09-14 NOTE — PLAN OF CARE
Aox4, MEGHAN with CPAP , denying pain, CBI running slow-moderate urine clear to pink colored with occasional small blood clots, on tele nsr/sb, up SBA, refusing accuchecks and insulin, plan to dc CBI when cleared by Urology, safety precautions in place, no further needs at this time.

## 2024-09-14 NOTE — PLAN OF CARE
Pt a&o x4.   Friendly & cooperative w/ care  Wife @ bedside.  Participating & helpful with plan of care  Up w/ sba.  Gait steady.  Pt has good safety awareness  Tylenol for pain  IV SL  Tolerating diet  Refusing accu checks & insulin  CBI restarted per urologist today  Soliman now draining clear yellow urine  Anticipate discharge tomorrow

## 2024-09-15 VITALS
RESPIRATION RATE: 16 BRPM | HEIGHT: 75 IN | WEIGHT: 271.69 LBS | HEART RATE: 58 BPM | DIASTOLIC BLOOD PRESSURE: 78 MMHG | BODY MASS INDEX: 33.78 KG/M2 | SYSTOLIC BLOOD PRESSURE: 131 MMHG | OXYGEN SATURATION: 95 % | TEMPERATURE: 99 F

## 2024-09-15 LAB
ANION GAP SERPL CALC-SCNC: 5 MMOL/L (ref 0–18)
BASOPHILS # BLD AUTO: 0.03 X10(3) UL (ref 0–0.2)
BASOPHILS NFR BLD AUTO: 0.4 %
BUN BLD-MCNC: 16 MG/DL (ref 9–23)
CALCIUM BLD-MCNC: 9.4 MG/DL (ref 8.7–10.4)
CHLORIDE SERPL-SCNC: 103 MMOL/L (ref 98–112)
CO2 SERPL-SCNC: 32 MMOL/L (ref 21–32)
CREAT BLD-MCNC: 1.03 MG/DL
EGFRCR SERPLBLD CKD-EPI 2021: 82 ML/MIN/1.73M2 (ref 60–?)
EOSINOPHIL # BLD AUTO: 0.13 X10(3) UL (ref 0–0.7)
EOSINOPHIL NFR BLD AUTO: 1.7 %
ERYTHROCYTE [DISTWIDTH] IN BLOOD BY AUTOMATED COUNT: 13.4 %
GLUCOSE BLD-MCNC: 141 MG/DL (ref 70–99)
HCT VFR BLD AUTO: 40.2 %
HGB BLD-MCNC: 13 G/DL
IMM GRANULOCYTES # BLD AUTO: 0.03 X10(3) UL (ref 0–1)
IMM GRANULOCYTES NFR BLD: 0.4 %
LYMPHOCYTES # BLD AUTO: 2.03 X10(3) UL (ref 1–4)
LYMPHOCYTES NFR BLD AUTO: 26.9 %
MCH RBC QN AUTO: 29.5 PG (ref 26–34)
MCHC RBC AUTO-ENTMCNC: 32.3 G/DL (ref 31–37)
MCV RBC AUTO: 91.2 FL
MONOCYTES # BLD AUTO: 0.55 X10(3) UL (ref 0.1–1)
MONOCYTES NFR BLD AUTO: 7.3 %
NEUTROPHILS # BLD AUTO: 4.78 X10 (3) UL (ref 1.5–7.7)
NEUTROPHILS # BLD AUTO: 4.78 X10(3) UL (ref 1.5–7.7)
NEUTROPHILS NFR BLD AUTO: 63.3 %
OSMOLALITY SERPL CALC.SUM OF ELEC: 294 MOSM/KG (ref 275–295)
PLATELET # BLD AUTO: 178 10(3)UL (ref 150–450)
POTASSIUM SERPL-SCNC: 4.5 MMOL/L (ref 3.5–5.1)
RBC # BLD AUTO: 4.41 X10(6)UL
SODIUM SERPL-SCNC: 140 MMOL/L (ref 136–145)
WBC # BLD AUTO: 7.6 X10(3) UL (ref 4–11)

## 2024-09-15 PROCEDURE — 85025 COMPLETE CBC W/AUTO DIFF WBC: CPT | Performed by: UROLOGY

## 2024-09-15 PROCEDURE — 80048 BASIC METABOLIC PNL TOTAL CA: CPT | Performed by: UROLOGY

## 2024-09-15 RX ORDER — TRIAMTERENE/HYDROCHLOROTHIAZID 37.5-25 MG
1 TABLET ORAL DAILY
Status: SHIPPED | COMMUNITY
Start: 2024-09-15

## 2024-09-15 NOTE — PROGRESS NOTES
VSS with cpap. CBI very slow rate tonight, urine output is pink clear. Irritation from the catheter, manageable with scheduled tylenol. IV SL. Tolerating diet, active BS. Refusing Accuchecks and insulin. MDs aware.Up sba. Wife at bedside. Plan of care discussed, call light in reach.

## 2024-09-15 NOTE — PROGRESS NOTES
[facility] Urology Progress Note    Jose Mcclelland Patient Status:  Outpatient in a Bed    1960 MRN [id number]   Location [facility] 3NW-A Attending Mercy Madden MD   Hosp Day # 0 PCP Reid Boyce MD     Subjective:  Jose Mcclelland is a(n) 64 year old male.    Hospital course to date: POD#2 s/p TURP    Current  complaints: He is feeling well. No pain. Urine clear this am off CBI so far. Minimal CBI needed overnight.    Objective:  Temp (24hrs), Av.3 °F (36.8 °C), Min:97.9 °F (36.6 °C), Max:98.7 °F (37.1 °C)    /77 (BP Location: Left arm)   Pulse 57   Temp 98.7 °F (37.1 °C) (Oral)   Resp 15   Ht 6' 3\" (1.905 m)   Wt 271 lb 11.5 oz (123.2 kg)   SpO2 94%   BMI 33.96 kg/m²     Intake/Output Summary (Last 24 hours) at 9/15/2024 1027  Last data filed at 9/15/2024 0900  Gross per 24 hour   Intake --   Output 4850 ml   Net -4850 ml     General: Calm, NAD  HEENT: NCAT, no scleral icterus  CV: RR  Pulmonary: breathing unlabored, symmetric bilaterally, no audible wheezes  [place] draining  clear faintly pink urine    Laboratory Data:  Lab Results   Component Value Date    WBC 7.6 09/15/2024    HGB 13.0 09/15/2024    HCT 40.2 09/15/2024    .0 09/15/2024    CREATSERUM 1.03 09/15/2024    BUN 16 09/15/2024     09/15/2024    K 4.5 09/15/2024     09/15/2024    CO2 32.0 09/15/2024     09/15/2024    CA 9.4 09/15/2024         Hospital Problem List:  Patient Active Problem List   Diagnosis    HTN (hypertension)    Bronchitis    Hyperlipemia, mixed    Hemochromatosis    Elevated glucose    MEGHAN on CPAP    Obesity (BMI 35.0-39.9 without comorbidity)    Thyroid nodule    BPH loc w urin obs/LUTS       IMPRESSION    1. POD#2 s/p TURP  -Urine more clear today    PLAN    1. Ambulate - if urine is staying clear, will plan for voiding trial later today. If hematuria increases, will keep one more day and plan for void trial and dc tomorrow. [place]  [place] and it would be difficult to have the catheter removed if he was to go home with it.    The above impression and plan were discussed in detail with the patient and his wife who verbalized understanding and all questions were answered.    Discussed with JERAMY Cardenas D.O.  [facility]  Department of Urology

## 2024-09-15 NOTE — PLAN OF CARE
Pt discharged home.  Discharge instructions given to pt & pt's wife, including:  Review of home meds  Diet & hydration  Activity  Empty bladder as soon as pt feels urge  Hygiene  Follow up care  Pt & wife verbalized understanding of all instructions  Left unit stable & ambulatory

## 2024-09-15 NOTE — PROGRESS NOTES
Spoke with urology.  Aware of void & pvr results.   Md states ok to discharge.  Will contact hospitalist

## 2024-09-15 NOTE — DISCHARGE INSTRUCTIONS
Home Care Instructions  CYSTOSCOPY/TRANSURETHRAL RESECTION PROSTATE  (TURP)  Dr. Madden     Your urine will probably be cranberry colored on and off for a couple of weeks. If your urine becomes thick and red, or if you have a large amount of clots when you urinate, call the doctor. Also call the doctor if you are unable to urinate.     Do not strain to have a bowel movement as this can cause bleeding (keep stools soft). I suggest the use of an Over-the-Counter stool softener.     Drink 6-10 glasses of water/juice each day. This will help flush the bladder out. You will want to slow down your fluid intake just prior to bedtime so you are not up all night urinating.     If you take aspirin or Ibuprofen (Advil, Nuprin, Motrin) you must have your doctor’s approval before taking them again. Check with your doctor before starting any other medications.     You may shower.     You may eat your usual diet.     No strenuous activity or lifting more than 15 pounds for 4 weeks.    Empty your bladder as soon as you feel the urge.   Do not try to hold back your urine.       9/15/2024      [facility]  [phone]        It has been a pleasure taking care of you!  Best wishes for a speedy recovery!  Kaitlyn DESHPANDE

## 2024-09-15 NOTE — PROGRESS NOTES
CC: follow-up hospital admission surgery    SUBJECTIVE:  Interval History:     No acute issues overnight  Cbi clamped again, pinkish outpt    OBJECTIVE:  Scheduled Meds:    dilTIAZem ER  480 mg Oral Daily    fenofibrate micronized  67 mg Oral Daily with breakfast    atorvastatin  40 mg Oral Nightly    oxybutynin ER  10 mg Oral Daily    acetaminophen  650 mg Oral Q4H While awake    insulin aspart  1-5 Units Subcutaneous TID AC and HS    tamsulosin  0.4 mg Oral Nightly     Continuous Infusions:    sodium chloride Stopped (09/15/24 0859)     PRN Meds:   oxyCODONE **OR** oxyCODONE    HYDROmorphone **OR** HYDROmorphone    ondansetron **OR** ondansetron    sennosides    phenazopyridine    glucose **OR** glucose **OR** glucose-vitamin C **OR** dextrose **OR** glucose **OR** glucose **OR** glucose-vitamin C    PHYSICAL EXAM  Vital signs: Temp:  [97.9 °F (36.6 °C)-98.7 °F (37.1 °C)] 98.7 °F (37.1 °C)  Pulse:  [52-67] 57  Resp:  [14-18] 15  BP: (104-133)/(62-78) 129/77  SpO2:  [90 %-94 %] 94 %      GENERAL - NAD, AAO  EYES- sclera anicteric   HENT- normocephalic   NECK - no JVD  CV- RRR  RESP - CTAB, normal resp effort  ABDOMEN- soft, NT/ND   EXT- no LE edema        Data Review:   Labs:   Recent Labs   Lab 09/14/24  0658 09/15/24  0639   WBC 7.8 7.6   HGB 13.1 13.0   MCV 87.0 91.2   .0 178.0       Recent Labs   Lab 09/14/24  0658 09/15/24  0639    140   K 4.7 4.5    103   CO2 32.0 32.0   BUN 15 16   CREATSERUM 0.94 1.03   CA 9.5 9.4   * 141*       No results for input(s): \"ALT\", \"AST\", \"ALB\", \"AMYLASE\", \"LIPASE\", \"LDH\" in the last 168 hours.    Invalid input(s): \"ALPHOS\", \"TBIL\", \"DBIL\", \"TPROT\"    Recent Labs   Lab 09/13/24  1555   PGLU 157*           ASSESSMENT/PLAN:    Patient is a 64 year old male with PMH sig for HTN, HLD, DM 2, obesity, ministerio, here for scheduled turp     Impression     -sp TURP  -post op pain     -DM 2  -HLD  -HTN     -obesity bmi 33  -MINISTERIO on CPAP        Plan     *  Post op  surgical management per surgery     *HLD  -statin     *HTN  -on multiple meds at home, resume stepwise  Bp currently controlled     *MEGHAN  -cpap    *DM2  -glucs overall controlled  Has refused accuchecks / iss     scds       Will continue to follow while hospitalized. Please page me or the on-call hospitalist with questions or concerns.    Collin Turner Hospitalist  [phone]  Answering Service: [phone]

## 2024-09-15 NOTE — PLAN OF CARE
Pt a&o x 4.  Pleasant & cooperative w/ care  Wife @ bedside.   Involved & helpful with plan of care  Appetite good.  Denies n&v.  BM today  Pt showered today  New orders noted for off unit privileges.   Pt went to [employer] w/ wife this afternoon  Reveles removed @ 1355  Voided x 1 since reveles removed.   100cc of pink urine, slightly hazy w/ red flecks  VSS  Will check another PVR after next void  Plan of care:  possible discharge tonight

## (undated) DEVICE — HF-RESECTION ELECTRODE PLASMALOOP LOOP, LARGE, 24 FR., 12°/16°, ESG TURIS: Brand: OLYMPUS

## (undated) DEVICE — Device

## (undated) DEVICE — DEVICE STATLOCK 3WAY STBL

## (undated) DEVICE — SOLUTION IRRIG 3000ML 0.9% NACL FLX CONT

## (undated) DEVICE — GLOVE SUR 7.5 SENSICARE PI PIP CRM PWD F

## (undated) DEVICE — HF-RESECTION ELECTRODE PLASMA-OVALBUTTON BUTTON, OVAL, 24 FR., 12°-30°, ESG TURIS: Brand: OLYMPUS

## (undated) DEVICE — PACK PBDS CYSTOSCOPY

## (undated) DEVICE — SLEEVE COMPR MD KNEE LEN SGL USE KENDALL SCD

## (undated) DEVICE — BAG,DRAINAGE,4L,A/R TOWER,METAL CLAMP: Brand: MEDLINE

## (undated) DEVICE — SYRINGE MED 10ML LL TIP W/O SFTY DISP

## (undated) DEVICE — SOLUTION IRRIG 1000ML ST H2O AQUALITE PLAS

## (undated) NOTE — LETTER
OUTSIDE TESTING RESULT REQUEST     IMPORTANT: FOR YOUR IMMEDIATE ATTENTION  Please FAX all test results listed below to: [phone]           Patient Name: Jose Mcclelland  Surgery Date: 2024  Medical Record: [id number]  CSN: [id number]  : 1960 - A: 64 y     Sex: male  Surgeon(s):  Mercy Madden MD  Procedure: CYSTOSCOPY TRANSURETHRAL RESECTION OF PROSTATE, LOOPOSCOPY  Anesthesia Type: General     Surgeon: Mercy Madden MD     The following Testing and Time Line are REQUIRED PER ANESTHESIA     EKG-done by your office 2024. Please fax signed tracing to above fax# for pt upcoming surgery. Any questions, call [phone]      Thank You,   Sent by:Ciara Mariano

## (undated) NOTE — ED AVS SNAPSHOT
[facility] Patient   MRN: [id number]    Department:  THE [facility] Emergency Department in [place]   Date of Visit:  2/23/2020           Disclosure     Insurance plans vary and the physician(s) referred by the ER may not be covered by your plan.  Please conta tell this physician (or your personal doctor if your instructions are to return to your personal doctor) about any new or lasting problems. The primary care or specialist physician will see patients referred from the [facility] Emergency Department.  Odessa Reese

## (undated) NOTE — LETTER
OUTSIDE TESTING RESULT REQUEST     IMPORTANT: FOR YOUR IMMEDIATE ATTENTION  Please FAX all test results listed below to: [phone]             Patient Name: Jose Mcclelland  Surgery Date: 2024  Medical Record: [id number]  CSN: [id number]  : 1960 - A: 64 y     Sex: male  Surgeon(s):  Mercy Madden MD  Procedure: CYSTOSCOPY TRANSURETHRAL RESECTION OF PROSTATE, LOOPOSCOPY  Anesthesia Type: General     Surgeon: Mercy Madden MD     The following Testing and Time Line are REQUIRED PER ANESTHESIA     Basic Metabolic Panel-pt has pre op appt next week. Pt had EKG at [facility] 2024-will request copy.      Thank You,   Sent by: Ciara Mariano

## (undated) NOTE — LETTER
[facility] [address], [address] [place] [address]    Consent for Anesthesia   1.    Ana Ríos agree to be cared for by a physician anesthesiologist alone and/or with a nurse anesthetist, who is specially trained to monitor me and give me · Rare risks include: remembering what happened during my procedure, allergic reactions to medications, injury to my airway, heart, lungs, vision, nerves, or muscles and in extremely rare instances death.   5. My doctor has explained to me other choices brenda Patient Name: Lizz Lam      : 1960                 Printed: 2020                                       Medical Record #: [id number]                                            Page 1 of 1

## (undated) NOTE — LETTER
URGENT: SURGERY IS TOMORROW 2024      OUTSIDE TESTING RESULT REQUEST     IMPORTANT: FOR YOUR IMMEDIATE ATTENTION  Please FAX all test results listed below to: [phone]     Testing already done on or about: 2024     * * * * If testing is NOT complete, arrange with patient A.S.A.P. * * * *      Patient Name: Jose Mcclelland  Surgery Date: 2024  Medical Record: [id number]  CSN: [id number]  : 1960 - A: 64 y     Sex: male  Surgeon(s):  Mercy Madden MD  Procedure: CYSTOSCOPY TRANSURETHRAL RESECTION OF PROSTATE, LOOPOSCOPY  Anesthesia Type: General     Surgeon: Mercy Madden MD     The following Testing and Time Line are REQUIRED PER ANESTHESIA     EKG READ AND SIGNED WITHIN   90 days      Thank You,   Sent by: Abida DESHPANDE